# Patient Record
Sex: MALE | URBAN - METROPOLITAN AREA
[De-identification: names, ages, dates, MRNs, and addresses within clinical notes are randomized per-mention and may not be internally consistent; named-entity substitution may affect disease eponyms.]

---

## 2022-08-22 ENCOUNTER — APPOINTMENT (RX ONLY)
Dept: URBAN - METROPOLITAN AREA CLINIC 45 | Facility: CLINIC | Age: 62
Setting detail: DERMATOLOGY
End: 2022-08-22

## 2022-08-22 DIAGNOSIS — L21.8 OTHER SEBORRHEIC DERMATITIS: ICD-10-CM | Status: INADEQUATELY CONTROLLED

## 2022-08-22 PROCEDURE — ? COUNSELING

## 2022-08-22 PROCEDURE — ? ADDITIONAL NOTES

## 2022-08-22 PROCEDURE — ? PRESCRIPTION

## 2022-08-22 PROCEDURE — 99204 OFFICE O/P NEW MOD 45 MIN: CPT

## 2022-08-22 PROCEDURE — ? PRESCRIPTION MEDICATION MANAGEMENT

## 2022-08-22 PROCEDURE — ? MEDICATION COUNSELING

## 2022-08-22 PROCEDURE — ? FULL BODY SKIN EXAM - DECLINED

## 2022-08-22 RX ORDER — TRIAMCINOLONE ACETONIDE 1 MG/G
OINTMENT TOPICAL
Qty: 30 | Refills: 1 | Status: CANCELLED | COMMUNITY
Start: 2022-08-22

## 2022-08-22 RX ORDER — TACROLIMUS 1 MG/G
OINTMENT TOPICAL BID
Qty: 30 | Refills: 4 | Status: ERX | COMMUNITY
Start: 2022-08-22

## 2022-08-22 RX ORDER — KETOCONAZOLE 20 MG/G
CREAM TOPICAL BID
Qty: 60 | Refills: 3 | Status: ERX | COMMUNITY
Start: 2022-08-22

## 2022-08-22 RX ADMIN — TRIAMCINOLONE ACETONIDE: 1 OINTMENT TOPICAL at 00:00

## 2022-08-22 RX ADMIN — KETOCONAZOLE: 20 CREAM TOPICAL at 00:00

## 2022-08-22 RX ADMIN — TACROLIMUS: 1 OINTMENT TOPICAL at 00:00

## 2022-08-22 ASSESSMENT — LOCATION SIMPLE DESCRIPTION DERM
LOCATION SIMPLE: LEFT EAR
LOCATION SIMPLE: LEFT CHEEK
LOCATION SIMPLE: RIGHT EAR

## 2022-08-22 ASSESSMENT — LOCATION ZONE DERM
LOCATION ZONE: FACE
LOCATION ZONE: EAR

## 2022-08-22 ASSESSMENT — LOCATION DETAILED DESCRIPTION DERM
LOCATION DETAILED: LEFT SUPERIOR CRUS OF ANTIHELIX
LOCATION DETAILED: RIGHT ANTIHELIX
LOCATION DETAILED: LEFT CENTRAL MALAR CHEEK

## 2022-08-22 NOTE — PROCEDURE: PRESCRIPTION MEDICATION MANAGEMENT
Initiate Treatment: tacrolimus 0.1 % topical ointment BID\\nQuantity: 30.0 g  Days Supply: 30\\nSig: Apply to aa of face twice daily as needed for flares.\\n\\nketoconazole 2 % topical cream Bid\\nQuantity: 60.0 g  Days Supply: 30\\nSig: Apply to affected areas of face twice daily x 2 weeks then prn flares\\n\\ntriamcinolone acetonide 0.1 % topical ointment \\nQuantity: 30.0 g  Days Supply: 30\\nSig: Apply to aa of body qd x 1 week for bad flares, then stop for 1 week and repeat prn
Detail Level: Zone
Render In Strict Bullet Format?: No

## 2022-08-22 NOTE — PROCEDURE: ADDITIONAL NOTES
Additional Notes: *****SCRIPT CHANGED TO triamcinolone 0.05
Render Risk Assessment In Note?: no
Detail Level: Simple

## 2022-08-24 ENCOUNTER — RX ONLY (OUTPATIENT)
Age: 62
Setting detail: RX ONLY
End: 2022-08-24

## 2022-08-24 RX ORDER — TRIAMCINOLONE ACETONIDE 0.5 MG/G
OINTMENT TOPICAL
Qty: 110 | Refills: 0 | Status: ERX | COMMUNITY
Start: 2022-08-24

## 2025-06-23 NOTE — PROCEDURE: MEDICATION COUNSELING
72 Imiquimod Pregnancy And Lactation Text: This medication is Pregnancy Category C. It is unknown if this medication is excreted in breast milk.